# Patient Record
Sex: FEMALE | Race: WHITE | Employment: FULL TIME | URBAN - METROPOLITAN AREA
[De-identification: names, ages, dates, MRNs, and addresses within clinical notes are randomized per-mention and may not be internally consistent; named-entity substitution may affect disease eponyms.]

---

## 2019-11-22 NOTE — PATIENT INSTRUCTIONS
Self-Care for Sore Throats    Sore throats happen for many reasons, such as colds, allergies, and infections caused by viruses or bacteria. In any case, your throat becomes red and sore.  Your goal for self-care is to reduce your discomfort while giving y · A temperature over 101°F (38.3°C)  · White spots on the throat  · Great difficulty swallowing  · Trouble breathing  · A skin rash  · Recent exposure to someone else with strep bacteria  · Severe hoarseness and swollen glands in the neck or jaw  Date Last · Your appetite may be poor, so a light diet is fine. Stay well hydrated by drinking 6 to 8 glasses of fluids per day (water, soft drinks, juices, tea, or soup). Extra fluids will help loosen secretions in the nose and lungs.   · Over-the-counter cold medic This medicine is for inhalation through the mouth. Follow the directions on your prescription label. Take your medicine at regular intervals. Do not use more often than directed. Make sure that you are using your inhaler correctly.  Ask you doctor or health Store at room temperature between 15 and 30 degrees C (59 and 86 degrees F). The contents are under pressure and may burst when exposed to heat or flame. Do not freeze. This medicine does not work as well if it is too cold.  Throw away any unused medicine a

## 2019-11-22 NOTE — PROGRESS NOTES
CHIEF COMPLAINT:   Patient presents with:  Sore Throat: X 1 day, worried about strep  URI: congestion, dry cough, no fevers. HPI:   Roopa Francois is a 27year old female presents to clinic with complaint of sore throat.  Patient has had for 1 day GENERAL: well developed, well nourished,in no apparent distress  SKIN: no rashes,no suspicious lesions  HEAD: atraumatic, normocephalic, Sinus pressure in right maxillary sinue  EYES: conjunctiva clear, EOM intact  EARS: TM's clear, non-injected, no bulgin Increase fluids and rest. Warm salt water gargles TID. Cepacol for sore throat. Humidifier in room.      May consider OTC tylenol or ibuprofen as needed and directed on packaging     May consider OTC guaifenesin as needed and directed on packaging to thin m · Ease pain with anesthetic sprays. Aspirin or an aspirin substitute also helps.  Remember, never give aspirin to anyone 25 or younger, or if you are already taking blood thinners.   · For sore throats caused by allergies, try antihistamines to block the al You have a viral upper respiratory illness (URI), which is another term for the common cold. This illness is contagious during the first few days. It is spread through the air by coughing and sneezing.  It may also be spread by direct contact (touching the · Over-the-counter cold medicines will not shorten the length of time you’re sick, but they may be helpful for the following symptoms: cough, sore throat, and nasal and sinus congestion.  If you take prescription medicines, ask your healthcare provider or p Talk to your pediatrician regarding the use of this medicine in children. Special care may be needed. What side effects may I notice from receiving this medicine?   Side effects that you should report to your doctor or health care professional as soon as p Store at room temperature between 15 and 30 degrees C (59 and 86 degrees F). The contents are under pressure and may burst when exposed to heat or flame. Do not freeze. This medicine does not work as well if it is too cold.  Throw away any unused medicine a

## 2023-02-20 ENCOUNTER — HOSPITAL ENCOUNTER (OUTPATIENT)
Age: 38
Discharge: HOME | End: 2023-02-20
Payer: COMMERCIAL

## 2023-02-20 VITALS
TEMPERATURE: 100 F | RESPIRATION RATE: 18 BRPM | OXYGEN SATURATION: 99 % | DIASTOLIC BLOOD PRESSURE: 67 MMHG | HEART RATE: 91 BPM | SYSTOLIC BLOOD PRESSURE: 125 MMHG

## 2023-02-20 DIAGNOSIS — J02.9 ACUTE VIRAL PHARYNGITIS: Primary | ICD-10-CM

## 2023-02-20 LAB
S PYO AG THROAT QL: NEGATIVE
SARS-COV-2 RNA RESP QL NAA+PROBE: NOT DETECTED

## 2023-02-20 PROCEDURE — 87880 STREP A ASSAY W/OPTIC: CPT | Performed by: NURSE PRACTITIONER

## 2023-02-20 PROCEDURE — 99203 OFFICE O/P NEW LOW 30 MIN: CPT | Performed by: NURSE PRACTITIONER

## 2023-02-20 PROCEDURE — U0002 COVID-19 LAB TEST NON-CDC: HCPCS | Performed by: NURSE PRACTITIONER

## 2023-02-20 RX ORDER — DEXAMETHASONE 4 MG/1
8 TABLET ORAL ONCE
Status: COMPLETED | OUTPATIENT
Start: 2023-02-20 | End: 2023-02-20

## 2023-02-20 NOTE — ED INITIAL ASSESSMENT (HPI)
Pt states having a sore throat, and feels like swallowing glass, pt stats son was recently sick. Pt denies fevers. Pt states has not been able to sleep because of pain. Pt states symptoms began Saturday. Pt staets having fatigue. Pt states having some sinus congestion.

## 2023-03-16 ENCOUNTER — HOSPITAL ENCOUNTER (OUTPATIENT)
Age: 38
Discharge: HOME | End: 2023-03-16
Payer: COMMERCIAL

## 2023-03-16 VITALS
SYSTOLIC BLOOD PRESSURE: 154 MMHG | OXYGEN SATURATION: 100 % | HEART RATE: 94 BPM | RESPIRATION RATE: 18 BRPM | TEMPERATURE: 99 F | DIASTOLIC BLOOD PRESSURE: 80 MMHG

## 2023-03-16 DIAGNOSIS — J01.40 ACUTE NON-RECURRENT PANSINUSITIS: Primary | ICD-10-CM

## 2023-03-16 LAB — SARS-COV-2 RNA RESP QL NAA+PROBE: NOT DETECTED

## 2023-03-16 PROCEDURE — U0002 COVID-19 LAB TEST NON-CDC: HCPCS | Performed by: NURSE PRACTITIONER

## 2023-03-16 PROCEDURE — 99213 OFFICE O/P EST LOW 20 MIN: CPT | Performed by: NURSE PRACTITIONER

## 2023-03-16 RX ORDER — AMOXICILLIN AND CLAVULANATE POTASSIUM 875; 125 MG/1; MG/1
1 TABLET, FILM COATED ORAL 2 TIMES DAILY
Qty: 20 TABLET | Refills: 0 | Status: SHIPPED | OUTPATIENT
Start: 2023-03-16 | End: 2023-03-26

## 2023-03-16 NOTE — ED INITIAL ASSESSMENT (HPI)
Pt here with complaints of sinus pressure, congestion and headache for 1 week, pt denies any fevers or sob

## 2023-11-06 ENCOUNTER — HOSPITAL ENCOUNTER (OUTPATIENT)
Age: 38
Discharge: HOME | End: 2023-11-06
Payer: COMMERCIAL

## 2023-11-06 VITALS
TEMPERATURE: 98 F | DIASTOLIC BLOOD PRESSURE: 64 MMHG | OXYGEN SATURATION: 100 % | HEART RATE: 98 BPM | RESPIRATION RATE: 20 BRPM | SYSTOLIC BLOOD PRESSURE: 108 MMHG

## 2023-11-06 DIAGNOSIS — J02.9 ACUTE VIRAL PHARYNGITIS: Primary | ICD-10-CM

## 2023-11-06 LAB — S PYO AG THROAT QL: NEGATIVE

## 2023-11-06 PROCEDURE — 87880 STREP A ASSAY W/OPTIC: CPT | Performed by: NURSE PRACTITIONER

## 2023-11-06 PROCEDURE — 99213 OFFICE O/P EST LOW 20 MIN: CPT | Performed by: NURSE PRACTITIONER

## 2023-11-06 NOTE — ED INITIAL ASSESSMENT (HPI)
Pt with sore throat, body aches, temperature (t-max 100.3), and intermittent nausea x3 days; denies cough or congestion; negative at-home covid test yesterday

## 2024-03-05 ENCOUNTER — TELEPHONE (OUTPATIENT)
Dept: OBGYN CLINIC | Facility: CLINIC | Age: 39
End: 2024-03-05

## 2024-03-05 ENCOUNTER — OFFICE VISIT (OUTPATIENT)
Dept: OBGYN CLINIC | Facility: CLINIC | Age: 39
End: 2024-03-05
Payer: COMMERCIAL

## 2024-03-05 VITALS
DIASTOLIC BLOOD PRESSURE: 60 MMHG | HEIGHT: 63 IN | BODY MASS INDEX: 24.45 KG/M2 | SYSTOLIC BLOOD PRESSURE: 118 MMHG | WEIGHT: 138 LBS

## 2024-03-05 DIAGNOSIS — F41.9 ANXIETY: ICD-10-CM

## 2024-03-05 DIAGNOSIS — Z01.419 WOMEN'S ANNUAL ROUTINE GYNECOLOGICAL EXAMINATION: ICD-10-CM

## 2024-03-05 DIAGNOSIS — Z12.4 SCREENING FOR CERVICAL CANCER: Primary | ICD-10-CM

## 2024-03-05 DIAGNOSIS — Z12.31 BREAST CANCER SCREENING BY MAMMOGRAM: ICD-10-CM

## 2024-03-05 DIAGNOSIS — Z30.431 IUD SURVEILLANCE: ICD-10-CM

## 2024-03-05 PROBLEM — L90.0 LICHEN SCLEROSUS: Status: ACTIVE | Noted: 2024-03-05

## 2024-03-05 PROCEDURE — 3078F DIAST BP <80 MM HG: CPT | Performed by: OBSTETRICS & GYNECOLOGY

## 2024-03-05 PROCEDURE — 87624 HPV HI-RISK TYP POOLED RSLT: CPT | Performed by: OBSTETRICS & GYNECOLOGY

## 2024-03-05 PROCEDURE — 99385 PREV VISIT NEW AGE 18-39: CPT | Performed by: OBSTETRICS & GYNECOLOGY

## 2024-03-05 PROCEDURE — 3008F BODY MASS INDEX DOCD: CPT | Performed by: OBSTETRICS & GYNECOLOGY

## 2024-03-05 PROCEDURE — 3074F SYST BP LT 130 MM HG: CPT | Performed by: OBSTETRICS & GYNECOLOGY

## 2024-03-05 RX ORDER — LORAZEPAM 1 MG/1
1 TABLET ORAL 2 TIMES DAILY PRN
Refills: 0 | Status: CANCELLED | OUTPATIENT
Start: 2024-03-05

## 2024-03-05 RX ORDER — CLOBETASOL PROPIONATE 0.5 MG/G
1 CREAM TOPICAL
Qty: 30 G | Refills: 3 | Status: SHIPPED | OUTPATIENT
Start: 2024-03-07 | End: 2025-03-02

## 2024-03-05 RX ORDER — CLOBETASOL PROPIONATE 0.5 MG/G
CREAM TOPICAL 2 TIMES DAILY
COMMUNITY
End: 2024-03-05

## 2024-03-05 NOTE — TELEPHONE ENCOUNTER
This MA left message on patient voicemail that Dr. Molina unable to refill Lorazepam prescription. Pt needs to have ordering physician refill medication.

## 2024-03-05 NOTE — PROGRESS NOTES
NEW GYN H&P     3/5/2024  3:21 PM    Chief Complaint   Patient presents with    New Patient     Pt wants annual exam/pap smear     Refill Request     Would like refill on meds    .    HPI: Patient is a 38 year old  LMP absent here to establish care - due for annual exam and PAP. Reports no cycles on Liletta IUD placed 2021. No gynecologic concerns or complaints.No pelvic pain. No abnormal vaginal discharge or bleeding.         No LMP recorded (lmp unknown). (Menstrual status: IUD - Intrauterine Device).    OB History    Para Term  AB Living   2 1 1   1 1   SAB IAB Ectopic Multiple Live Births   1       1      # Outcome Date GA Lbr Surinder/2nd Weight Sex Delivery Anes PTL Lv   2 Term 21 38w6d  9 lb 10.9 oz (4.39 kg) M CS-LTranv Spinal N CAMELIA   1 SAB               Obstetric Comments   Pt had csection        GYN hx:    Hx Prior Abnormal Pap: Yes (pt recalls around )  Pap Result Notes: 2019 abnormal pap  Follow Up Recommendation: Mammo: Never*error  CONTRACEPTION: Liletta  LAST MAMMOGRAM: Had normal mammogram age 36 for maternal history breast cancer      Current Outpatient Medications   Medication Sig Dispense Refill    clobetasol 0.05 % External Cream Apply topically 2 (two) times daily.      Albuterol Sulfate  (90 Base) MCG/ACT Inhalation Aero Soln INHALE 1-2 PUFFS EVERY 4-6 HOURS AS NEEDED FOR SHORTNESS OF BREATH.  0    Montelukast Sodium 10 MG Oral Tab Take 1 tablet (10 mg total) by mouth once daily.  1    LORazepam 1 MG Oral Tab TAKE 1 TABLET BY MOUTH TWICE A DAY AS NEEDED FOR ANXIETY  0    Albuterol Sulfate  (90 Base) MCG/ACT Inhalation Aero Soln Inhale 2 puffs into the lungs every 4 (four) hours as needed for Wheezing. 1 Inhaler 0       Past Medical History:   Diagnosis Date    Family history of breast cancer in mother     age 53    History of use of contraceptive intrauterine device (IUD)     Litetta IUD    Lichen sclerosus      Past Surgical History:   Procedure  Laterality Date          2 years ago    INSERT INTRAUTERINE DEVICE      Liletta     Allergies   Allergen Reactions    Doxycycline SWELLING and SHORTNESS OF BREATH     Family History   Problem Relation Age of Onset    No Known Problems Father     Breast Cancer Mother     Prostate Cancer Paternal Grandfather      Social History     Socioeconomic History    Marital status: Legally    Tobacco Use    Smoking status: Never    Smokeless tobacco: Never   Vaping Use    Vaping Use: Never used   Substance and Sexual Activity    Alcohol use: Never    Drug use: Never     Social History     Social History Narrative    ** Merged History Encounter **            ROS:     Review of Systems:  A comprehensive 10 point ROS was completed. All pertinent positives and negatives noted in the HPI.     /60   Ht 63\"   Wt 138 lb (62.6 kg)   LMP  (LMP Unknown)   BMI 24.45 kg/m²     Exam:   GENERAL: well developed, well nourished, in no apparent distress  SKIN: no rashes, no lesions  HEENT: normal  LUNGS: respiration unlabored  CARDIOVASCULAR: no peripheral edema or varicosities, skin warm and dry  BREASTS: bilaterally nontender, no palpable masses, no nipple discharge, no skin changes, no axillary adenopathy  ABDOMEN: Soft, non distended; non tender, no masses  GYNE/:   External Genitalia: normal, no lesions, good perineal support  Urethra: meatus normal   Bladder: well supported  Vagina: normal mucosa, no lesions, no discharge   Uterus: normal size, mobile, nontender  Cervix: string seen at normal os, no lesions or bleeding  Adnexa:normal size, bilaterally nontender, no palpable masses  Cul-de-sac: normal  R/V: normal perineum, no hemorrhoids  EXTREMITIES:  nontender without edema      A/P: Patient is 38 year old female     1. Women's annual routine gynecological examination  - PAP today    2. IUD surveillance  - Normal position    3. Breast cancer screening by mammogram  - Order placed - due  7/2024        3/5/2024  Karey Molina MD

## 2024-03-06 LAB — HPV I/H RISK 1 DNA SPEC QL NAA+PROBE: NEGATIVE

## 2024-04-04 ENCOUNTER — TELEPHONE (OUTPATIENT)
Dept: OBGYN CLINIC | Facility: CLINIC | Age: 39
End: 2024-04-04

## 2024-11-01 ENCOUNTER — HOSPITAL ENCOUNTER (OUTPATIENT)
Age: 39
Discharge: HOME | End: 2024-11-01
Payer: COMMERCIAL

## 2024-11-01 ENCOUNTER — APPOINTMENT (OUTPATIENT)
Dept: GENERAL RADIOLOGY | Age: 39
End: 2024-11-01
Attending: PHYSICIAN ASSISTANT
Payer: COMMERCIAL

## 2024-11-01 VITALS
OXYGEN SATURATION: 100 % | SYSTOLIC BLOOD PRESSURE: 137 MMHG | TEMPERATURE: 98 F | RESPIRATION RATE: 20 BRPM | DIASTOLIC BLOOD PRESSURE: 76 MMHG | HEART RATE: 81 BPM

## 2024-11-01 DIAGNOSIS — R05.1 ACUTE COUGH: ICD-10-CM

## 2024-11-01 DIAGNOSIS — J45.21 MILD INTERMITTENT ASTHMA WITH EXACERBATION (HCC): Primary | ICD-10-CM

## 2024-11-01 DIAGNOSIS — Z88.9 HISTORY OF SEASONAL ALLERGIES: ICD-10-CM

## 2024-11-01 DIAGNOSIS — Z20.822 ENCOUNTER FOR LABORATORY TESTING FOR COVID-19 VIRUS: ICD-10-CM

## 2024-11-01 LAB — SARS-COV-2 RNA RESP QL NAA+PROBE: NOT DETECTED

## 2024-11-01 PROCEDURE — 99214 OFFICE O/P EST MOD 30 MIN: CPT | Performed by: PHYSICIAN ASSISTANT

## 2024-11-01 PROCEDURE — 94640 AIRWAY INHALATION TREATMENT: CPT | Performed by: PHYSICIAN ASSISTANT

## 2024-11-01 PROCEDURE — U0002 COVID-19 LAB TEST NON-CDC: HCPCS | Performed by: PHYSICIAN ASSISTANT

## 2024-11-01 PROCEDURE — 71046 X-RAY EXAM CHEST 2 VIEWS: CPT | Performed by: PHYSICIAN ASSISTANT

## 2024-11-01 RX ORDER — FLUTICASONE PROPIONATE 50 MCG
2 SPRAY, SUSPENSION (ML) NASAL DAILY
Qty: 16 G | Refills: 0 | Status: SHIPPED | OUTPATIENT
Start: 2024-11-01 | End: 2024-12-01

## 2024-11-01 RX ORDER — PREDNISONE 20 MG/1
60 TABLET ORAL ONCE
Status: COMPLETED | OUTPATIENT
Start: 2024-11-01 | End: 2024-11-01

## 2024-11-01 RX ORDER — BENZONATATE 100 MG/1
100 CAPSULE ORAL 3 TIMES DAILY PRN
Qty: 30 CAPSULE | Refills: 0 | Status: SHIPPED | OUTPATIENT
Start: 2024-11-01 | End: 2024-12-01

## 2024-11-01 RX ORDER — PREDNISONE 20 MG/1
40 TABLET ORAL DAILY
Qty: 10 TABLET | Refills: 0 | Status: SHIPPED | OUTPATIENT
Start: 2024-11-01 | End: 2024-11-06

## 2024-11-01 RX ORDER — ALBUTEROL SULFATE 0.83 MG/ML
2.5 SOLUTION RESPIRATORY (INHALATION) ONCE
Status: DISCONTINUED | OUTPATIENT
Start: 2024-11-01 | End: 2024-11-01

## 2024-11-01 RX ORDER — IPRATROPIUM BROMIDE AND ALBUTEROL SULFATE 2.5; .5 MG/3ML; MG/3ML
3 SOLUTION RESPIRATORY (INHALATION) ONCE
Status: COMPLETED | OUTPATIENT
Start: 2024-11-01 | End: 2024-11-01

## 2024-11-01 NOTE — DISCHARGE INSTRUCTIONS
Recommend taking over-the-counter Flonase nasal spray and Claritin/Zyrtec for underlying allergies.  Good over-the-counter cough medication is Mucinex or Robitussin. Tessalon prescription for helping suppress cough.  Prescription for prednisone for additional 5 days to help with inflammation and improve asthma flareup.

## 2024-11-01 NOTE — ED INITIAL ASSESSMENT (HPI)
Here for eval of uri symptoms x 3 days reported to be aggravating her asthma. Pt has albuterol mdi at home, last used 10/31 hs. Cough is dry, no fever. + fatigue and sob w/ wheezing. No home covid test performed.

## 2024-11-01 NOTE — ED PROVIDER NOTES
Patient Seen in: [facility]      History     Chief Complaint   Patient presents with    Difficulty Breathing     Entered by patient     Stated Complaint: Difficulty Breathing    Subjective:   HPI      Patient is a 39-year-old female with asthma, seasonal/environmental allergies, smoker, presenting to immediate care for evaluation of cold symptoms.  Onset: 3 days.  Associated: Nasal congestion, fatigue, chest congestion, nonproductive cough, increased work of breathing, and wheezing.  Increased use of albuterol inhaler.  No cough medications taken for symptoms.  No fevers.  No chest pain.  Sick contacts.  Plan for underlying lower respiratory infection-pneumonia.  No lethargy, weakness, confusion.  Not immunocompromised.      Objective:     No pertinent past medical history.            No pertinent past surgical history.              No pertinent social history.            Review of Systems   Constitutional:  Positive for fatigue. Negative for chills and fever.   HENT:  Positive for congestion.    Respiratory:  Positive for cough, shortness of breath and wheezing.    Cardiovascular:  Negative for chest pain.   Gastrointestinal:  Negative for abdominal pain, diarrhea and vomiting.   Musculoskeletal:  Negative for neck pain and neck stiffness.   Skin:  Negative for rash.   Allergic/Immunologic: Positive for environmental allergies. Negative for immunocompromised state.   Neurological:  Negative for dizziness, weakness, light-headedness and headaches.   Psychiatric/Behavioral:  Negative for confusion.    All other systems reviewed and are negative.      Positive for stated complaint: Difficulty Breathing  Other systems are as noted in HPI.  Constitutional and vital signs reviewed.      All other systems reviewed and negative except as noted above.    Physical Exam     ED Triage Vitals [11/01/24 1216]   /76   Pulse 81   Resp 20   Temp 98.1 °F (36.7 °C)   Temp src Oral   SpO2 100 %   O2 Device None  (Room air)       Current Vitals:   Vital Signs  BP: 137/76  Pulse: 81  Resp: 20  Temp: 98.1 °F (36.7 °C)  Temp src: Oral    Oxygen Therapy  SpO2: 100 %  O2 Device: None (Room air)        Physical Exam  Vitals and nursing note reviewed.   Constitutional:       General: She is not in acute distress.     Appearance: Normal appearance. She is well-developed. She is not ill-appearing, toxic-appearing or diaphoretic.      Comments: Alert, cooperative, pleasant, nontoxic-appearing   HENT:      Head: Normocephalic and atraumatic.      Comments: Nasal congestion, postnasal drip     Right Ear: Tympanic membrane normal.      Left Ear: Tympanic membrane normal.      Nose: Congestion present.      Mouth/Throat:      Mouth: Mucous membranes are moist.      Pharynx: No oropharyngeal exudate or posterior oropharyngeal erythema.   Eyes:      Conjunctiva/sclera: Conjunctivae normal.   Cardiovascular:      Rate and Rhythm: Normal rate and regular rhythm.      Pulses: Normal pulses.      Comments: RRR  Pulmonary:      Effort: Pulmonary effort is normal.      Breath sounds: No wheezing or rhonchi.      Comments: Lungs Sounds diminished with bibasilar expiratory wheezing.  No conversational dyspnea.  No retractions.  No increased work of breathing  Musculoskeletal:         General: No swelling. Normal range of motion.      Cervical back: Normal range of motion. No rigidity.   Skin:     Capillary Refill: Capillary refill takes less than 2 seconds.   Neurological:      General: No focal deficit present.      Mental Status: She is alert.      Motor: No weakness.      Coordination: Coordination normal.      Gait: Gait normal.   Psychiatric:         Mood and Affect: Mood normal.         Behavior: Behavior normal.         Thought Content: Thought content normal.         Judgment: Judgment normal.           ED Course     Labs Reviewed   RAPID SARS-COV-2 BY PCR - Normal     Results for orders placed or performed during the hospital encounter of  11/01/24   Rapid SARS-CoV-2 by PCR    Collection Time: 11/01/24 12:35 PM    Specimen: Nares; Other   Result Value Ref Range    Rapid SARS-CoV-2 by PCR Not Detected Not Detected     XR CHEST PA + LAT CHEST (CPT=71046)   Final Result   PROCEDURE: XR CHEST PA + LAT CHEST (CPT=71046)       COMPARISON: None.       INDICATIONS: Cough with shortness of breath. Hx asthma.       TECHNIQUE:   Two views.         FINDINGS:    CARDIAC/VASC: Normal.  No cardiac silhouette abnormality or cardiomegaly.     Unremarkable pulmonary vasculature.     MEDIAST/ARJUN: No visible mass or adenopathy.    LUNGS/PLEURA: Normal.  No significant pulmonary parenchymal abnormalities.     No effusion or pleural thickening.     BONES: No fracture or visible bony lesion.    OTHER: Negative.                     =====   CONCLUSION: No radiographic evidence of acute cardiopulmonary process.               Dictated by (CST): Bret Marte MD on 11/01/2024 at 12:54 PM        Finalized by (CST): Bret Marte MD on 11/01/2024 at 12:55 PM                      MDM       Differential diagnoses considered included, but are not exclusive of: URI, COVID, otitis, sinusitis, pharyngitis, strep throat, bronchitis, pneumonia, asthma exacerbation etc.    - Dx: Acute asthma exacerbation, mild, intermittent, initial encounter  - Recent Viral Illness  - COVID PCR negative  - Overall well-appearing  - Lungs: Faint Expiratory wheezing  - Given DuoNeb for wheezing, symptomatically improved  - No hypoxia  - No conversational dyspnea  - No respiratory distress  - Afebrile in ED  - No signs of dehydration  - Tolerating PO  - Plan: Outpatient management  - Suppotive care  - OTC motrin/tylenol prn fevers/maylgias  - Tessalon and Mucinex for cough  - Flonase and oral antihistamine Claritin/Zyrtec for underlying allergies/congestion  - Rx prednisone 5 day course for asthma exacerbation  - Albuterol inhaler provided prn sob/wheezing  - Discusses ED strict return precautions  -  F/u PCP      Medical Decision Making      Disposition and Plan     Clinical Impression:  1. Mild intermittent asthma with exacerbation (HCC)    2. Encounter for laboratory testing for COVID-19 virus    3. Acute cough    4. History of seasonal allergies         Disposition:  Discharge  11/1/2024  1:15 pm    Follow-up:  Mathew Sharma  [address] [facility] [address]  [facility] [address]  [phone]                Medications Prescribed:  Current Discharge Medication List        START taking these medications    Details   predniSONE 20 MG Oral Tab Take 2 tablets (40 mg total) by mouth daily for 5 days.  Qty: 10 tablet, Refills: 0      fluticasone propionate 50 MCG/ACT Nasal Suspension 2 sprays by Nasal route daily.  Qty: 16 g, Refills: 0      benzonatate 100 MG Oral Cap Take 1 capsule (100 mg total) by mouth 3 (three) times daily as needed for cough.  Qty: 30 capsule, Refills: 0                 Supplementary Documentation:

## 2025-01-23 ENCOUNTER — HOSPITAL ENCOUNTER (OUTPATIENT)
Age: 40
Discharge: HOME | End: 2025-01-23
Payer: COMMERCIAL

## 2025-01-23 VITALS
RESPIRATION RATE: 20 BRPM | TEMPERATURE: 98 F | SYSTOLIC BLOOD PRESSURE: 123 MMHG | OXYGEN SATURATION: 98 % | DIASTOLIC BLOOD PRESSURE: 75 MMHG | HEART RATE: 87 BPM

## 2025-01-23 DIAGNOSIS — J03.90 ACUTE TONSILLITIS, UNSPECIFIED ETIOLOGY: Primary | ICD-10-CM

## 2025-01-23 LAB — S PYO AG THROAT QL: NEGATIVE

## 2025-01-23 PROCEDURE — 87880 STREP A ASSAY W/OPTIC: CPT | Performed by: NURSE PRACTITIONER

## 2025-01-23 PROCEDURE — 99213 OFFICE O/P EST LOW 20 MIN: CPT | Performed by: NURSE PRACTITIONER

## 2025-01-23 PROCEDURE — 87081 CULTURE SCREEN ONLY: CPT | Performed by: NURSE PRACTITIONER

## 2025-01-23 RX ORDER — BUPROPION HYDROCHLORIDE 150 MG/1
150 TABLET ORAL EVERY MORNING
COMMUNITY
Start: 2024-12-30

## (undated) NOTE — MR AVS SNAPSHOT
After Visit Summary   3/5/2024    Laxmi Owens   MRN: [id number]           Visit Information     Date & Time  3/5/2024  2:15 PM Provider  Karey Molina MD [facility] - OB/GYN Dept. Phone  [phone]      Your Vitals Were  Most recent update: 3/5/2024  2:34 PM    BP   118/60    Ht   63\"    Wt   138 lb    LMP    (LMP Unknown)    BMI   24.45 kg/m²         Allergies as of 3/5/2024  Review status set to Review Complete on 3/5/2024       Noted Reaction Type Reactions    Doxycycline 11/11/2020    SWELLING, SHORTNESS OF BREATH      Your Current Medications        Dosage    clobetasol 0.05 % External Cream Starting on 3/7/2024. Apply 1 Application topically twice a week.    Albuterol Sulfate  (90 Base) MCG/ACT Inhalation Aero Soln INHALE 1-2 PUFFS EVERY 4-6 HOURS AS NEEDED FOR SHORTNESS OF BREATH.    Montelukast Sodium 10 MG Oral Tab Take 1 tablet (10 mg total) by mouth once daily.    LORazepam 1 MG Oral Tab TAKE 1 TABLET BY MOUTH TWICE A DAY AS NEEDED FOR ANXIETY    Albuterol Sulfate  (90 Base) MCG/ACT Inhalation Aero Soln Inhale 2 puffs into the lungs every 4 (four) hours as needed for Wheezing.      Diagnoses for This Visit    Screening for cervical cancer   [606567]  -  Primary  Women's annual routine gynecological examination   [4038525]    IUD surveillance   [7913210]    Breast cancer screening by mammogram   [8228410]    Anxiety   [935520]             We Ordered the Following     Normal Orders This Visit    Hpv Dna  High Risk , Thin Prep Collect [[id number] CUSTOM]     THINPREP PAP SMEAR ONLY [[id number] CUSTOM]     ThinPrep PAP Smear [[id number] CUSTOM]     Future Labs/Procedures Expected by Expires    Hpv Dna  High Risk , Thin Prep Collect [[id number] CUSTOM]  3/5/2024 3/5/2025    KHAI GISELA 2D+3D SCREENING BILAT (CPT=77067/77363) [COMBO CPT(R)]  3/5/2024 (Approximate) 3/5/2025    ThinPrep PAP Smear [[id number] CUSTOM]  3/5/2024 3/5/2025       Imaging Scheduling Instructions     Around March 5, 2024   Imaging:   [facility] GISELA 2D+3D SCREENING BILAT (CPT=77067/58312)    Instructions: Your order will generate a \"Scheduling Ticket\" that will be available in [employer] to schedule on your own at a time most convenient to you.      If you do not have a [employer] Account, or if you prefer to speak with someone to schedule your appointment, please call [employer] [facility] Scheduling at [phone].                  Did you know that [facility] primary care physicians now offer Video Visits through [employer] for adult patients for a variety of conditions such as allergies, back pain and cold symptoms? Skip the drive and waiting room and online chat with a doctor face-to-face using your web-cam enabled computer or mobile device wherever you are. Video Visits cost $50 and can be paid hassle-free using a credit, debit, or health savings card.  Not active on [employer]? Ask us how to get signed up today!          If you receive a survey from [employer], please take a few minutes to complete it and provide feedback. We strive to deliver the best patient experience and are looking for ways to make improvements. Your feedback will help us do so. For more information on [employer], please visit [email or web address]           No text in SmartText           No text in SmartText